# Patient Record
Sex: FEMALE | Race: WHITE | NOT HISPANIC OR LATINO | URBAN - METROPOLITAN AREA
[De-identification: names, ages, dates, MRNs, and addresses within clinical notes are randomized per-mention and may not be internally consistent; named-entity substitution may affect disease eponyms.]

---

## 2017-08-04 VITALS
OXYGEN SATURATION: 98 % | SYSTOLIC BLOOD PRESSURE: 106 MMHG | RESPIRATION RATE: 18 BRPM | DIASTOLIC BLOOD PRESSURE: 70 MMHG | WEIGHT: 165.79 LBS | HEIGHT: 68 IN | TEMPERATURE: 98 F | HEART RATE: 64 BPM

## 2017-08-04 NOTE — H&P ADULT - HISTORY OF PRESENT ILLNESS
41F c/o neck pain x    Presents today for elective ACDF C6-C7, local bone graft 41F c/o neck pain x chronic    Presents today for elective ACDF C6-C7, local bone graft. Pain radiates to right arm.  No numbness or weakness of UE or LE. Independent ambulator. No other complaints.

## 2017-08-04 NOTE — H&P ADULT - NSHPPHYSICALEXAM_GEN_ALL_CORE
MSK:  +Decreased ROM secondary to pain, cervical spine    Remainder of physical exam per medical clearance note. MSK:  +Decreased ROM secondary to pain, cervical spine  /biceps/triceps 5/5 b/l  gross sensation intact b/l UE  neuromotor intact b/l LE  radial palpable b/l  good wrist ext, finger ext./abduction b/l    Remainder of physical exam per medical clearance note.

## 2017-08-04 NOTE — H&P ADULT - PROBLEM SELECTOR PLAN 1
Admit to Orthopaedic Service.  Presents today for elective ACDF C6-C7, local bone graft  Pt medically stable and cleared for procedure today by Dr. Dean Lei MD

## 2017-08-04 NOTE — PATIENT PROFILE ADULT. - PMH
Costochondritis    Neck pain  C-spine radiculopathy Costochondritis    Neck pain  C-spine radiculopathy  Pneumonia

## 2017-08-04 NOTE — H&P ADULT - NSHPLABSRESULTS_GEN_ALL_CORE
Preop CBC, CMP, PT/PTT/INR, UA - WNL per medical clearance   Preop EKG - normal sinus rhythm - reviewed by medical clearance   Preop CXR - no apparent infiltrates - reviewed by medical clearance   Preop Spirometry - WNL - reviewed by medical clearance

## 2017-08-07 ENCOUNTER — INPATIENT (INPATIENT)
Facility: HOSPITAL | Age: 42
LOS: 0 days | Discharge: HOME | DRG: 472 | End: 2017-08-08
Attending: ORTHOPAEDIC SURGERY | Admitting: ORTHOPAEDIC SURGERY
Payer: COMMERCIAL

## 2017-08-07 DIAGNOSIS — Z41.9 ENCOUNTER FOR PROCEDURE FOR PURPOSES OTHER THAN REMEDYING HEALTH STATE, UNSPECIFIED: Chronic | ICD-10-CM

## 2017-08-07 DIAGNOSIS — M54.12 RADICULOPATHY, CERVICAL REGION: ICD-10-CM

## 2017-08-07 RX ORDER — OXYCODONE AND ACETAMINOPHEN 5; 325 MG/1; MG/1
1 TABLET ORAL EVERY 4 HOURS
Qty: 0 | Refills: 0 | Status: DISCONTINUED | OUTPATIENT
Start: 2017-08-07 | End: 2017-08-07

## 2017-08-07 RX ORDER — LACTOBACILLUS ACIDOPHILUS 100MM CELL
1 CAPSULE ORAL
Qty: 0 | Refills: 0 | COMMUNITY

## 2017-08-07 RX ORDER — SODIUM CHLORIDE 9 MG/ML
1000 INJECTION, SOLUTION INTRAVENOUS
Qty: 0 | Refills: 0 | Status: DISCONTINUED | OUTPATIENT
Start: 2017-08-07 | End: 2017-08-08

## 2017-08-07 RX ORDER — KETOROLAC TROMETHAMINE 30 MG/ML
15 SYRINGE (ML) INJECTION EVERY 6 HOURS
Qty: 0 | Refills: 0 | Status: DISCONTINUED | OUTPATIENT
Start: 2017-08-07 | End: 2017-08-08

## 2017-08-07 RX ORDER — CEFAZOLIN SODIUM 1 G
2000 VIAL (EA) INJECTION EVERY 8 HOURS
Qty: 0 | Refills: 0 | Status: COMPLETED | OUTPATIENT
Start: 2017-08-07 | End: 2017-08-08

## 2017-08-07 RX ORDER — LACTOBACILLUS ACIDOPHILUS 100MM CELL
1 CAPSULE ORAL DAILY
Qty: 0 | Refills: 0 | Status: DISCONTINUED | OUTPATIENT
Start: 2017-08-07 | End: 2017-08-08

## 2017-08-07 RX ORDER — HYDROMORPHONE HYDROCHLORIDE 2 MG/ML
0.5 INJECTION INTRAMUSCULAR; INTRAVENOUS; SUBCUTANEOUS
Qty: 0 | Refills: 0 | Status: DISCONTINUED | OUTPATIENT
Start: 2017-08-07 | End: 2017-08-08

## 2017-08-07 RX ORDER — ACETAMINOPHEN 500 MG
1000 TABLET ORAL ONCE
Qty: 0 | Refills: 0 | Status: COMPLETED | OUTPATIENT
Start: 2017-08-07 | End: 2017-08-08

## 2017-08-07 RX ORDER — OXYCODONE AND ACETAMINOPHEN 5; 325 MG/1; MG/1
2 TABLET ORAL EVERY 6 HOURS
Qty: 0 | Refills: 0 | Status: DISCONTINUED | OUTPATIENT
Start: 2017-08-07 | End: 2017-08-07

## 2017-08-07 RX ORDER — PREGABALIN 225 MG/1
0 CAPSULE ORAL
Qty: 0 | Refills: 0 | COMMUNITY

## 2017-08-07 RX ORDER — ACETAMINOPHEN 500 MG
650 TABLET ORAL EVERY 6 HOURS
Qty: 0 | Refills: 0 | Status: DISCONTINUED | OUTPATIENT
Start: 2017-08-07 | End: 2017-08-08

## 2017-08-07 RX ORDER — HYDROMORPHONE HYDROCHLORIDE 2 MG/ML
0.5 INJECTION INTRAMUSCULAR; INTRAVENOUS; SUBCUTANEOUS
Qty: 0 | Refills: 0 | Status: DISCONTINUED | OUTPATIENT
Start: 2017-08-07 | End: 2017-08-07

## 2017-08-07 RX ORDER — OXYCODONE HYDROCHLORIDE 5 MG/1
5 TABLET ORAL EVERY 4 HOURS
Qty: 0 | Refills: 0 | Status: DISCONTINUED | OUTPATIENT
Start: 2017-08-07 | End: 2017-08-08

## 2017-08-07 RX ORDER — OXYCODONE HYDROCHLORIDE 5 MG/1
10 TABLET ORAL EVERY 4 HOURS
Qty: 0 | Refills: 0 | Status: DISCONTINUED | OUTPATIENT
Start: 2017-08-07 | End: 2017-08-08

## 2017-08-07 RX ORDER — MORPHINE SULFATE 50 MG/1
4 CAPSULE, EXTENDED RELEASE ORAL
Qty: 0 | Refills: 0 | Status: DISCONTINUED | OUTPATIENT
Start: 2017-08-07 | End: 2017-08-08

## 2017-08-07 RX ORDER — SENNA PLUS 8.6 MG/1
2 TABLET ORAL AT BEDTIME
Qty: 0 | Refills: 0 | Status: DISCONTINUED | OUTPATIENT
Start: 2017-08-07 | End: 2017-08-08

## 2017-08-07 RX ORDER — GABAPENTIN 400 MG/1
100 CAPSULE ORAL THREE TIMES A DAY
Qty: 0 | Refills: 0 | Status: DISCONTINUED | OUTPATIENT
Start: 2017-08-07 | End: 2017-08-08

## 2017-08-07 RX ORDER — ONDANSETRON 8 MG/1
4 TABLET, FILM COATED ORAL EVERY 6 HOURS
Qty: 0 | Refills: 0 | Status: DISCONTINUED | OUTPATIENT
Start: 2017-08-07 | End: 2017-08-08

## 2017-08-07 RX ORDER — MORPHINE SULFATE 50 MG/1
4 CAPSULE, EXTENDED RELEASE ORAL EVERY 4 HOURS
Qty: 0 | Refills: 0 | Status: DISCONTINUED | OUTPATIENT
Start: 2017-08-07 | End: 2017-08-08

## 2017-08-07 RX ADMIN — Medication 15 MILLIGRAM(S): at 17:36

## 2017-08-07 RX ADMIN — Medication 100 MILLIGRAM(S): at 17:04

## 2017-08-07 RX ADMIN — HYDROMORPHONE HYDROCHLORIDE 0.5 MILLIGRAM(S): 2 INJECTION INTRAMUSCULAR; INTRAVENOUS; SUBCUTANEOUS at 12:15

## 2017-08-07 RX ADMIN — GABAPENTIN 100 MILLIGRAM(S): 400 CAPSULE ORAL at 23:01

## 2017-08-07 RX ADMIN — HYDROMORPHONE HYDROCHLORIDE 0.5 MILLIGRAM(S): 2 INJECTION INTRAMUSCULAR; INTRAVENOUS; SUBCUTANEOUS at 12:30

## 2017-08-07 RX ADMIN — HYDROMORPHONE HYDROCHLORIDE 0.5 MILLIGRAM(S): 2 INJECTION INTRAMUSCULAR; INTRAVENOUS; SUBCUTANEOUS at 15:27

## 2017-08-07 RX ADMIN — SENNA PLUS 2 TABLET(S): 8.6 TABLET ORAL at 21:43

## 2017-08-07 RX ADMIN — Medication 1 TABLET(S): at 17:04

## 2017-08-07 RX ADMIN — HYDROMORPHONE HYDROCHLORIDE 0.5 MILLIGRAM(S): 2 INJECTION INTRAMUSCULAR; INTRAVENOUS; SUBCUTANEOUS at 12:45

## 2017-08-07 RX ADMIN — OXYCODONE HYDROCHLORIDE 5 MILLIGRAM(S): 5 TABLET ORAL at 23:00

## 2017-08-07 RX ADMIN — Medication 15 MILLIGRAM(S): at 17:04

## 2017-08-07 NOTE — PROGRESS NOTE ADULT - ASSESSMENT
Spoke with patient preoperatively and she aware I will be assisting during today's surgery and disclosure performed.

## 2017-08-07 NOTE — DISCHARGE NOTE ADULT - OTHER SIGNIFICANT FINDINGS
Documentation on this chart by others are electronically signed but were not reviewed for accuracy. NLT

## 2017-08-07 NOTE — PROVIDER CONTACT NOTE (OTHER) - ASSESSMENT
Pt c/o chest tightness non radiating +3/10 on pain scale..vss  106/70 HR 62 SR no ectopy..temp 97.9..Orthopedic team notified...In to see patient..

## 2017-08-07 NOTE — DISCHARGE NOTE ADULT - MEDICATION SUMMARY - MEDICATIONS TO STOP TAKING
I will STOP taking the medications listed below when I get home from the hospital:    ibuprofen 800 mg oral tablet  --  by mouth , As Needed    traMADol  -- 37.5 milligram(s) by mouth , As Needed

## 2017-08-07 NOTE — DISCHARGE NOTE ADULT - MEDICATION SUMMARY - MEDICATIONS TO TAKE
I will START or STAY ON the medications listed below when I get home from the hospital:    acetaminophen-oxycodone 325 mg-5 mg oral tablet  -- 1 tab(s) by mouth every 4 hours, As Needed -for severe pain MDD:6  -- Caution federal law prohibits the transfer of this drug to any person other  than the person for whom it was prescribed.  May cause drowsiness.  Alcohol may intensify this effect.  Use care when operating dangerous machinery.  This prescription cannot be refilled.  This product contains acetaminophen.  Do not use  with any other product containing acetaminophen to prevent possible liver damage.  Using more of this medication than prescribed may cause serious breathing problems.    -- Indication: For Pain    gabapentin 100 mg oral capsule  -- 1 cap(s) by mouth 3 times a day MDD:3  -- It is very important that you take or use this exactly as directed.  Do not skip doses or discontinue unless directed by your doctor.  May cause drowsiness.  Alcohol may intensify this effect.  Use care when operating dangerous machinery.    -- Indication: For Pain    lactobacillus acidophilus oral capsule  -- 1 cap(s) by mouth once a day  -- Indication: For home med    Vitamin B12  --  by mouth once a day  -- Indication: For home med

## 2017-08-07 NOTE — DISCHARGE NOTE ADULT - CARE PLAN
Principal Discharge DX:	Cervical radiculopathy  Goal:	improvement after surgery  Instructions for follow-up, activity and diet:	No strenuous activity, heavy lifting, driving, tub bathing, or returning to work until cleared by MD.  You may shower--dressing is waterproof.  Remove dressing after post op day 5, then leave incision open to air.  Follow up with Dr. Mejia, call office to schedule an appt within 10-14 days.  If you don't have a bowel movement by post op day 3, then take Milk of Magnesia (over the counter).  If no bowel movement by at least post op day 5, then use a Dulcolax suppository (over the counter) and/or a Fleets enema--if still no bowel movement, call your MD.  Contact your doctor if you experience: fever greater than 101.5, chills, chest pain, difficulty breathing, bleeding, redness or heat around the incision. Principal Discharge DX:	Cervical radiculopathy  Goal:	improvement after surgery  Instructions for follow-up, activity and diet:	No strenuous activity, bending, twisting, heavy lifting, driving, tub bathing, or returning to work until cleared by MD.  You may shower  Change dressing daily.  Remove dressing after post op day 5, then leave incision open to air.  Follow up with Dr. Mejia in his office in 10 to 14 days after surgery.  If you don't have a bowel movement by post op day 3, then take Milk of Magnesia (over the counter).  If no bowel movement by at least post op day 5, then use a Dulcolax suppository (over the counter) and/or a Fleets enema--if still no bowel movement, call your MD.  Contact your doctor if you experience: fever greater than 101.5, chills, chest pain, difficulty breathing, bleeding, redness or heat around the incision. Principal Discharge DX:	Cervical radiculopathy  Goal:	improvement after surgery  Instructions for follow-up, activity and diet:	see below

## 2017-08-07 NOTE — DISCHARGE NOTE ADULT - PLAN OF CARE
improvement after surgery No strenuous activity, heavy lifting, driving, tub bathing, or returning to work until cleared by MD.  You may shower--dressing is waterproof.  Remove dressing after post op day 5, then leave incision open to air.  Follow up with Dr. Mejia, call office to schedule an appt within 10-14 days.  If you don't have a bowel movement by post op day 3, then take Milk of Magnesia (over the counter).  If no bowel movement by at least post op day 5, then use a Dulcolax suppository (over the counter) and/or a Fleets enema--if still no bowel movement, call your MD.  Contact your doctor if you experience: fever greater than 101.5, chills, chest pain, difficulty breathing, bleeding, redness or heat around the incision. No strenuous activity, bending, twisting, heavy lifting, driving, tub bathing, or returning to work until cleared by MD.  You may shower  Change dressing daily.  Remove dressing after post op day 5, then leave incision open to air.  Follow up with Dr. Mejia in his office in 10 to 14 days after surgery.  If you don't have a bowel movement by post op day 3, then take Milk of Magnesia (over the counter).  If no bowel movement by at least post op day 5, then use a Dulcolax suppository (over the counter) and/or a Fleets enema--if still no bowel movement, call your MD.  Contact your doctor if you experience: fever greater than 101.5, chills, chest pain, difficulty breathing, bleeding, redness or heat around the incision. see below

## 2017-08-07 NOTE — PROVIDER CONTACT NOTE (OTHER) - ACTION/TREATMENT ORDERED:
teaching done regarding use and purpose of incentive spirometer...verbalized understanding..return demonstration done using appropriate technique.  pt cleared for transfer..

## 2017-08-07 NOTE — DISCHARGE NOTE ADULT - CARE PROVIDER_API CALL
Nikita Mejia (MD), Orthopaedic Surgery  [address]  [address]  [place], [place] [address]  Phone: [phone]  Fax: [phone]

## 2017-08-07 NOTE — DISCHARGE NOTE ADULT - HOSPITAL COURSE
Admit  OR  Periop Antibx  DVT ppx  PT   Pain mgt Admit  OR - ACDF C6 to C7 8/7/17  Periop Antibx  DVT ppx  Pain mgt

## 2017-08-07 NOTE — CONSULT NOTE ADULT - SUBJECTIVE AND OBJECTIVE BOX
Pain Management Consult Note - [facility] [phone]    Chief Complaint: neck pain     HPI: this is a 41 year old woman who presents with neck pain with radiation down her right arm and shoulder. as an out patient she has only been taking tramadol 37.5 1-2 times a day over the past 4 days along with gabapentin and ibuprofen. the patient presents today for ACDF c6-7      Pain is _x__ sharp ____dull ___burning ___achy ___ Intensity: ____ mild __x_mod ___severe     Location ____surgical site ___x_cervical _____lumbar ____abd __right __upper ext____lower ext    Worse with __x__activity ____movement _____physical therapy___ Rest    Improved with __x__medication __x__rest ____physical therapy    ROS: Const:  __-_febrile   Eyes:___ENT:___CV: -___chest pain  Resp: __-__sob  GI:_-__nausea _-__vomiting _-__abd pain ___npo ___clears __full diet __bm  :___ Musk: __+_pain _+__spasm  Skin:___ Neuro:  _-__ladzbwqm_-__xrowinket___ numbness _-__weakness _-__paresth  Psych:__anxiety  Endo:___ Heme:__, ___all others reviewed and negative    Allergies    No Known Allergies    Intolerances        PAST MEDICAL & SURGICAL HISTORY:  Pneumonia  Costochondritis  Neck pain: C-spine radiculopathy  Surgery, elective: kidney stone removal      SH: __dneies _Tobacco   __denies _Alcohol                          FH:FAMILY HISTORY: noncontributory           vitals not in the computer but visualized  by me, wnl     PHYSICAL EXAM:  Gen Appearance: _x__no acute distress _x__appropriate        Neuro: _x__SILT feet____ EOM Intact Psych: AAOX_3_, ___mood/affect appropriate        Eyes: ___conjunctiva WNL  __x___ Pupils equal and round        ENT: ___ears and nose atraumatic__x_ Hearing grossly intact        Neck: ___trachea midline, no visible masses ___thyroid without palpable mass    Resp: ___Nml WOB____No tactile fremitus ___clear to auscultation    Cardio: _x__extremities free from edema ____pedal pulses palpable    GI/Abdomen: __x_soft __x___ Nontender___x___Nondistended_____HSM    Lymphatic: ___no palpable nodes in neck  ___no palpable nodes calves and feet    Skin/Wound: ___Incision, ___Dressing c/d/i,   __x__surrounding tissues soft,  ___drain/chest tube present____    Muscular: EHL __5_/5  Gastrocnemius_5__/5    ___absent clubbing/cyanosis      ASSESSMENT: This is a 41y old Female with a history of   Cervical radiculopathy: Cervical radiculopathy  who presents for ACDF c6-7 with Dr. Mejia     Recommended Treatment PLAN:  1. Oxycodone 5-10 mg PO q 4 hours PRN   2. Dilaudid 0.5mg IV q10 mins x 3 doses while in PACU  3. Dilaudid 0.5mg q2 hours PRN  4. Ofirmev 1g IV x1  5. Toradol 15mg IV q 6 hours x4 doses

## 2017-08-07 NOTE — DISCHARGE NOTE ADULT - ADDITIONAL INSTRUCTIONS
Please follow up with your primary care provider No strenuous activity, heavy lifting, driving, tub bathing, or returning to work until cleared by MD.  You may shower  Change dressing daiyl  Remove dressing after post op day 5, then leave incision open to air.  Follow up with Dr. Mejia in his office in 2 weeks.  Any staples/sutures will be removed in 2 weeks.   If you don't have a bowel movement by post op day 3, then take Milk of Magnesia (over the counter).  If no bowel movement by at least post op day 5, then use a Dulcolax suppository (over the counter) and/or a Fleets enema--if still no bowel movement, call your MD.  Contact your doctor if you experience: fever greater than 101.5, chills, chest pain, difficulty breathing, bleeding, redness or heat around the incision.    Please follow up with your primary care provider.

## 2017-08-08 VITALS
DIASTOLIC BLOOD PRESSURE: 72 MMHG | SYSTOLIC BLOOD PRESSURE: 145 MMHG | OXYGEN SATURATION: 97 % | RESPIRATION RATE: 15 BRPM | HEART RATE: 83 BPM | TEMPERATURE: 98 F

## 2017-08-08 LAB
ANION GAP SERPL CALC-SCNC: 12 MMOL/L — SIGNIFICANT CHANGE UP (ref 5–17)
BASOPHILS NFR BLD AUTO: 1 % — SIGNIFICANT CHANGE UP (ref 0–2)
BUN SERPL-MCNC: 9 MG/DL — SIGNIFICANT CHANGE UP (ref 7–23)
CALCIUM SERPL-MCNC: 9.2 MG/DL — SIGNIFICANT CHANGE UP (ref 8.4–10.5)
CHLORIDE SERPL-SCNC: 102 MMOL/L — SIGNIFICANT CHANGE UP (ref 96–108)
CO2 SERPL-SCNC: 25 MMOL/L — SIGNIFICANT CHANGE UP (ref 22–31)
CREAT SERPL-MCNC: 0.7 MG/DL — SIGNIFICANT CHANGE UP (ref 0.5–1.3)
EOSINOPHIL NFR BLD AUTO: 2 % — SIGNIFICANT CHANGE UP (ref 0–6)
GLUCOSE SERPL-MCNC: 99 MG/DL — SIGNIFICANT CHANGE UP (ref 70–99)
HCT VFR BLD CALC: 34.9 % — SIGNIFICANT CHANGE UP (ref 34.5–45)
HGB BLD-MCNC: 11.7 G/DL — SIGNIFICANT CHANGE UP (ref 11.5–15.5)
LYMPHOCYTES # BLD AUTO: 15.5 % — SIGNIFICANT CHANGE UP (ref 13–44)
MCHC RBC-ENTMCNC: 30.5 PG — SIGNIFICANT CHANGE UP (ref 27–34)
MCHC RBC-ENTMCNC: 33.5 G/DL — SIGNIFICANT CHANGE UP (ref 32–36)
MCV RBC AUTO: 91.1 FL — SIGNIFICANT CHANGE UP (ref 80–100)
MONOCYTES NFR BLD AUTO: 6.4 % — SIGNIFICANT CHANGE UP (ref 2–14)
NEUTROPHILS NFR BLD AUTO: 75.1 % — SIGNIFICANT CHANGE UP (ref 43–77)
PLATELET # BLD AUTO: 306 K/UL — SIGNIFICANT CHANGE UP (ref 150–400)
POTASSIUM SERPL-MCNC: 4 MMOL/L — SIGNIFICANT CHANGE UP (ref 3.5–5.3)
POTASSIUM SERPL-SCNC: 4 MMOL/L — SIGNIFICANT CHANGE UP (ref 3.5–5.3)
RBC # BLD: 3.83 M/UL — SIGNIFICANT CHANGE UP (ref 3.8–5.2)
RBC # FLD: 12.4 % — SIGNIFICANT CHANGE UP (ref 10.3–16.9)
SODIUM SERPL-SCNC: 139 MMOL/L — SIGNIFICANT CHANGE UP (ref 135–145)
WBC # BLD: 9.6 K/UL — SIGNIFICANT CHANGE UP (ref 3.8–10.5)
WBC # FLD AUTO: 9.6 K/UL — SIGNIFICANT CHANGE UP (ref 3.8–10.5)

## 2017-08-08 PROCEDURE — 93010 ELECTROCARDIOGRAM REPORT: CPT

## 2017-08-08 RX ORDER — ZALEPLON 10 MG
5 CAPSULE ORAL AT BEDTIME
Qty: 0 | Refills: 0 | Status: DISCONTINUED | OUTPATIENT
Start: 2017-08-08 | End: 2017-08-08

## 2017-08-08 RX ORDER — GABAPENTIN 400 MG/1
0 CAPSULE ORAL
Qty: 0 | Refills: 0 | COMMUNITY

## 2017-08-08 RX ORDER — OXYCODONE HYDROCHLORIDE 5 MG/1
1 TABLET ORAL
Qty: 42 | Refills: 0 | OUTPATIENT
Start: 2017-08-08 | End: 2017-08-15

## 2017-08-08 RX ORDER — TRAMADOL HYDROCHLORIDE 50 MG/1
37.5 TABLET ORAL
Qty: 0 | Refills: 0 | COMMUNITY

## 2017-08-08 RX ORDER — IBUPROFEN 200 MG
0 TABLET ORAL
Qty: 0 | Refills: 0 | COMMUNITY

## 2017-08-08 RX ORDER — GABAPENTIN 400 MG/1
1 CAPSULE ORAL
Qty: 21 | Refills: 0 | OUTPATIENT
Start: 2017-08-08 | End: 2017-08-15

## 2017-08-08 RX ADMIN — Medication 100 MILLIGRAM(S): at 00:11

## 2017-08-08 RX ADMIN — HYDROMORPHONE HYDROCHLORIDE 0.5 MILLIGRAM(S): 2 INJECTION INTRAMUSCULAR; INTRAVENOUS; SUBCUTANEOUS at 03:17

## 2017-08-08 RX ADMIN — GABAPENTIN 100 MILLIGRAM(S): 400 CAPSULE ORAL at 06:42

## 2017-08-08 RX ADMIN — Medication 15 MILLIGRAM(S): at 12:00

## 2017-08-08 RX ADMIN — Medication 15 MILLIGRAM(S): at 06:42

## 2017-08-08 RX ADMIN — Medication 1000 MILLIGRAM(S): at 07:13

## 2017-08-08 RX ADMIN — Medication 400 MILLIGRAM(S): at 06:41

## 2017-08-08 RX ADMIN — Medication 15 MILLIGRAM(S): at 07:16

## 2017-08-08 RX ADMIN — Medication 5 MILLIGRAM(S): at 02:52

## 2017-08-08 RX ADMIN — Medication 15 MILLIGRAM(S): at 00:44

## 2017-08-08 RX ADMIN — Medication 1 TABLET(S): at 11:05

## 2017-08-08 RX ADMIN — GABAPENTIN 100 MILLIGRAM(S): 400 CAPSULE ORAL at 11:05

## 2017-08-08 RX ADMIN — OXYCODONE HYDROCHLORIDE 5 MILLIGRAM(S): 5 TABLET ORAL at 00:00

## 2017-08-08 RX ADMIN — Medication 15 MILLIGRAM(S): at 11:05

## 2017-08-08 RX ADMIN — Medication 15 MILLIGRAM(S): at 00:11

## 2017-08-08 RX ADMIN — HYDROMORPHONE HYDROCHLORIDE 0.5 MILLIGRAM(S): 2 INJECTION INTRAMUSCULAR; INTRAVENOUS; SUBCUTANEOUS at 02:52

## 2017-08-08 NOTE — PROGRESS NOTE ADULT - SUBJECTIVE AND OBJECTIVE BOX
Patient seen, examined in preop area with continued radicular symptoms, no interval change since last visit.   Exam is unchanged with regard to weakness in the right upper extremity with 3-4/5 elbow extension and interosei and 4/5 finger extension and wrist flexion.  Left upper extremity is 5/5. Reflexes are unchanged.  Negative Shah.    The condition and treatment options were discussed with the patient.  The risks, benefits and alternatives to surgery were discussed. The primary goal of surgery is to help alleviate radicular pain and prevent further neurological deterioration.  Full neurological recovery including motor and resolution of numbness cannot be guaranteed. She acknowledges understanding that she has disc herniations at other levels that may become issues at a later date.  The complications of surgery were discussed and include, but are not limited to, wound problems, infection bleeding, vascular injury, nerve injury, paralysis, csf leak, persistent symptoms, hardware failure loss of fixation, non-union, pseudarthrosis, junctional/adjacent level disease, difficulty swallowing, hoarse voice, vocal cord paralysis, esophageal injury, deep vein thrombosis, pulmonary embolus, heart attack, stroke, death and need for additional surgery of any kind.  All questions were answered and informed consent was obtained.
patient seen, examined.  Did well overnight; reports some neck discofomfort and mild residual right radicular symptoms.   Swallowing without difficulty, tolerating diet, has been out of bed, ambulating, voiding without difficulty.   Vocalizing well.    PE:   dressing dry and intact; drain in place- min drainage  motor 4+/5 (signficantly improved) interossei and elbow extension  sensory intact to light touch  deep tendon reflexes unchanged.  no calf tenderness (sequential compression devices are on and in place)    imp:  pod #1   pain management  remove drain this AM  sequential compression devices while in bed  incentive spirometry (I personally reviewed instructions and technique with patient)  Continue progressive ambulation  avoid lifting, twisting, bending, exposure to cigarette smoke, Non-steroidal anti-inflammatories  vitamin D, calcium, metamucil/colace  cleared to discharge to home today   encouraged hydration  call for questions, fevers, chills or any wound drainage  go to nearest emergency room for any new symptoms, difficulty swallowing or breathing  follow-up in 7-10 days
Did well o/n. Slightly combative this AM.     Procedure: Anterior Cervical Discectomy with Fusion C6-C7  Surgeon: Dr. Mejia    Pt comfortable, without complaints  Denies CP, SOB, N/V, numbness/tingling     Vital Signs Last 24 Hrs  T(C): 37.1 (08 Aug 2017 03:00), Max: 37.2 (08 Aug 2017 00:07)  T(F): 98.7 (08 Aug 2017 03:00), Max: 98.9 (08 Aug 2017 00:07)  HR: 90 (08 Aug 2017 03:00) (54 - 90)  BP: 114/73 (08 Aug 2017 03:00) (98/61 - 119/67)  BP(mean): 80 (07 Aug 2017 13:35) (72 - 85)  RR: 15 (08 Aug 2017 03:00) (15 - 20)  SpO2: 98% (08 Aug 2017 03:00) (98% - 100%)    NAD  Dressing C/D/I, 1 hemovac drain  General: Pt Alert and oriented   wwp  SILT  Motor:  Motor Strength 5/5 to /interossei/triceps/biceps/deltoid bilaterally. AIN/PIN intact. 5/5 B/L TA/GS/EHL/FHL      A/P: 41yFemale POD#1 s/p ACDF C6-C7   - Stable  - Pain Control  - DVT ppx: SCDs  - Post op abx: Ancef  - PT, WBS: WBAT  - F/U AM Labs
Orthopaedics Post Op Check    Procedure: Anterior Cervical Discectomy with Fusion C6-C7  Surgeon: Dr. Mejia    Pt comfortable, without complaints  Denies CP, SOB, N/V, numbness/tingling     Vital Signs Last 24 Hrs  T(C): 35.9 (07 Aug 2017 12:05), Max: 35.9 (07 Aug 2017 12:05)  T(F): 96.6 (07 Aug 2017 12:05), Max: 96.6 (07 Aug 2017 12:05)  HR: 60 (07 Aug 2017 12:35) (54 - 67)  BP: 110/71 (07 Aug 2017 12:35) (103/66 - 115/70)  BP(mean): 85 (07 Aug 2017 12:35) (79 - 85)  RR: 16 (07 Aug 2017 12:35) (16 - 20)  SpO2: 100% (07 Aug 2017 12:35) (99% - 100%)  AVSS, NAD    Dressing C/D/I, 1 hemovac drain  General: Pt Alert and oriented     Pulses: Radial pulses 2+ bilaterally   Sensation: In tact and equal to distal bilateral lower extremities   Motor:  Motor Strength 5/5 to /interossei/triceps/biceps/deltoid bilaterally. AIN/PIN intact.     Post op XR: Intra operative fluoroscopy utilized     A/P: 41yFemale POD#0 s/p ACDF C6-C7   - Stable  - Pain Control  - DVT ppx: SCDs  - Post op abx: Ancef  - PT, WBS: WBAT  - F/U AM Labs
Pt. seen at 0913  Pain Management Progress Note - [facility] [phone]    HPI:  Pt. complains of neck pain and pain at the base of the skull that is controlled with current regimen.  Denies side effects from pain medications.             Pertinent PMH: Pain at: ___Back _x__Neck___Knee ___Hip ___Shoulder ___ Opioid tolerance    Pain is _x__ sharp ____dull ___burning ___achy ___ Intensity: ____ mild ____mod ____severe     Location __x___surgical site __x___cervical _____lumbar ____abd _____upper ext____lower ext    Worse with __x__activity _x___movement _____physical therapy___ Rest    Improved with __x__medication _x___rest ____physical therapy    lactated ringers.  acetaminophen   Tablet  acetaminophen   Tablet.  oxyCODONE    5 mG/acetaminophen 325 mG  oxyCODONE    5 mG/acetaminophen 325 mG  morphine  - Injectable  ceFAZolin   IVPB  ondansetron Injectable  senna  multivitamin  morphine  - Injectable  gabapentin  lactobacillus acidophilus  oxyCODONE    IR  oxyCODONE    IR  HYDROmorphone  Injectable  HYDROmorphone  Injectable  acetaminophen  IVPB.  ketorolac   Injectable  zaleplon      ROS: Const:  ___febrile   Eyes:___ENT:___CV: ___chest pain  Resp: ____sob  GI:_-__nausea __-_vomiting ____abd pain ___npo ___clears ___full diet __bm  :___ Musk: _+__pain ___spasm  Skin:___ Neuro:  __-_lkhbdddc_-__werovaxap____ numbness ___weakness ___paresthesia  Psych:___anxiety  Endo:___ Heme:___Allergy:___      08-08 @ [id number]:81763 mL/min/1.73M2          Hemoglobin: 11.7 g/dL (08-08 @ 07:34)        T(C): 37.1 (08-08-17 @ 03:00), Max: 37.2 (08-08-17 @ 00:07)  HR: 90 (08-08-17 @ 03:00) (54 - 90)  BP: 114/73 (08-08-17 @ 03:00) (98/61 - 119/67)  RR: 15 (08-08-17 @ 03:00) (15 - 20)  SpO2: 98% (08-08-17 @ 03:00) (98% - 100%)  Wt(kg): --     PHYSICAL EXAM:  Gen Appearance: _x__no acute distress __x_appropriate         Neuro: _x__SILT feet__x__ EOM Intact Psych: AAOX_3_, _x__mood/affect appropriate        Eyes: _x__conjunctiva WNL  __x___ Pupils equal and round        ENT: _x__ears and nose atraumatic__x_ Hearing grossly intact        Neck: ___trachea midline, no visible masses ___thyroid without palpable mass    Resp: _x__Nml WOB____No tactile fremitus ___clear to auscultation    Cardio: ___extremities free from edema ____pedal pulses palpable    GI/Abdomen: _x__soft __x___ Nontender______Nondistended_____HSM    Lymphatic: ___no palpable nodes in neck  ___no palpable nodes calves and feet    Skin/Wound: ___Incision, _x__Dressing c/d/i,   ____surrounding tissues soft,  ___drain/chest tube present____    Muscular: EHL __5_/5  Gastrocnemius___/5    __x_absent clubbing/cyanosis         ASSESSMENT:  This is a 41y old Female with a history of:  Pneumonia  Costochondritis  Neck pain  Cervical radiculopathy  and cervical radiculopathy S/P ACDF C6-C7 and is doing well.        Recommended Treatment PLAN:  1.  Oxycodone 5-10 mg po q4h prn  2.  Dilaudid 0.5 mg IV q2h prn  3.  Tylenol 975 mg po q8h  4.  Gabapentin 100 mg TID

## 2017-08-10 DIAGNOSIS — G89.29 OTHER CHRONIC PAIN: ICD-10-CM

## 2017-08-10 DIAGNOSIS — M50.123 CERVICAL DISC DISORDER AT C6-C7 LEVEL WITH RADICULOPATHY: ICD-10-CM

## 2017-08-10 DIAGNOSIS — Z87.891 PERSONAL HISTORY OF NICOTINE DEPENDENCE: ICD-10-CM

## 2017-08-10 DIAGNOSIS — M50.023 CERVICAL DISC DISORDER AT C6-C7 LEVEL WITH MYELOPATHY: ICD-10-CM

## 2017-08-10 DIAGNOSIS — Z87.442 PERSONAL HISTORY OF URINARY CALCULI: ICD-10-CM

## 2017-08-12 LAB — SURGICAL PATHOLOGY STUDY: SIGNIFICANT CHANGE UP

## 2017-08-24 PROCEDURE — 36415 COLL VENOUS BLD VENIPUNCTURE: CPT

## 2017-08-24 PROCEDURE — 85025 COMPLETE CBC W/AUTO DIFF WBC: CPT

## 2017-08-24 PROCEDURE — 88304 TISSUE EXAM BY PATHOLOGIST: CPT

## 2017-08-24 PROCEDURE — 80048 BASIC METABOLIC PNL TOTAL CA: CPT

## 2017-08-24 PROCEDURE — 95940 IONM IN OPERATNG ROOM 15 MIN: CPT

## 2017-08-24 PROCEDURE — 76000 FLUOROSCOPY <1 HR PHYS/QHP: CPT

## 2017-08-24 PROCEDURE — 93005 ELECTROCARDIOGRAM TRACING: CPT

## 2017-08-24 PROCEDURE — C1713: CPT

## 2017-08-24 PROCEDURE — C1889: CPT

## 2020-07-31 NOTE — H&P ADULT - PROBLEM SELECTOR PROBLEM 1
Left message for mom that referral is in place for cardiology I can mail a copy if she would like me to Cervical radiculopathy
